# Patient Record
Sex: MALE | Race: WHITE | NOT HISPANIC OR LATINO
[De-identification: names, ages, dates, MRNs, and addresses within clinical notes are randomized per-mention and may not be internally consistent; named-entity substitution may affect disease eponyms.]

---

## 2019-11-04 PROBLEM — Z00.00 ENCOUNTER FOR PREVENTIVE HEALTH EXAMINATION: Status: ACTIVE | Noted: 2019-11-04

## 2021-06-24 ENCOUNTER — APPOINTMENT (OUTPATIENT)
Dept: CARDIOLOGY | Facility: CLINIC | Age: 56
End: 2021-06-24
Payer: MEDICAID

## 2021-06-24 VITALS
BODY MASS INDEX: 31.08 KG/M2 | SYSTOLIC BLOOD PRESSURE: 130 MMHG | RESPIRATION RATE: 16 BRPM | WEIGHT: 198 LBS | HEART RATE: 78 BPM | HEIGHT: 67 IN | OXYGEN SATURATION: 97 % | DIASTOLIC BLOOD PRESSURE: 75 MMHG

## 2021-06-24 DIAGNOSIS — Z78.9 OTHER SPECIFIED HEALTH STATUS: ICD-10-CM

## 2021-06-24 DIAGNOSIS — F17.210 NICOTINE DEPENDENCE, CIGARETTES, UNCOMPLICATED: ICD-10-CM

## 2021-06-24 DIAGNOSIS — Z82.49 FAMILY HISTORY OF ISCHEMIC HEART DISEASE AND OTHER DISEASES OF THE CIRCULATORY SYSTEM: ICD-10-CM

## 2021-06-24 DIAGNOSIS — E78.5 HYPERLIPIDEMIA, UNSPECIFIED: ICD-10-CM

## 2021-06-24 DIAGNOSIS — Z86.79 PERSONAL HISTORY OF OTHER DISEASES OF THE CIRCULATORY SYSTEM: ICD-10-CM

## 2021-06-24 DIAGNOSIS — S59.919A UNSPECIFIED INJURY OF UNSPECIFIED FOREARM, INITIAL ENCOUNTER: ICD-10-CM

## 2021-06-24 PROCEDURE — 93000 ELECTROCARDIOGRAM COMPLETE: CPT

## 2021-06-24 PROCEDURE — 99204 OFFICE O/P NEW MOD 45 MIN: CPT

## 2021-06-24 PROCEDURE — 99072 ADDL SUPL MATRL&STAF TM PHE: CPT

## 2021-06-24 NOTE — REASON FOR VISIT
[FreeTextEntry1] : This is a 56-year-old male who presents for cardiac evaluation.  Prompting this as concerns about left arm tingling tightness numbness over period of about 1 month.  Symptoms can last hours at a time are not necessarily exertional.\par \par He has no chest pain or shortness of breath.  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  Experiences no shortness of breath chest pain or exertional fatigability with any of this.  Several years back the patient did have an elevated blood pressure but when he dropped his weight from 240 to less than 200 that disappeared.

## 2021-06-24 NOTE — PHYSICAL EXAM
[de-identified] :                    Well appearing and nourished with no obvious deformities or distress.\par \par Eyes: \par No conjunctival injection and no xanthelasmas.\par HEENT: \par Normocephalic.Normal oral mucosa. No pallor or cyanosis\par Neck: \par No jugular venous distension. with normal A and V wave forms. No palpable adenopathy.\par Cardiovascular: \par Normal rate and rhythm with normal S1, S2 and a grade 1/6 systolic murmur. Distal arterial pulses are normal. No significant peripheral edema.\par Pulmonary: \par Lungs are clear to auscultation and percussion. Normal respiratory pattern without any accessory muscle use\par Abdomen: \par Soft, non-tender ; no palpable organomegaly or masses.\par Extremities:\par No digital clubbing, cyanosis or ischemic changes.\par Skin: \par No skin lesions, rashes, ulcers or xanthomas.\par Psychiatric: \par Alert and oriented to person, place and time. Appropriate mood and affect.

## 2021-06-24 NOTE — ASSESSMENT
[FreeTextEntry1] : ECG: Normal sinus rhythm at 78 bpm.  Normal axis intervals.  No significant ST-T wave changes.\par \par Laboratory data 2/27/2021:\par Cholesterol 212\par HDL 47\par \par Triglycerides 276\par \par Impression:\par 1.  The left forearm complaints are clearly not cardiac in nature.  Given his aggressive workouts in the gym and his work as a contractor, seems most likely that he suffered some musculoskeletal injury to that left forearm or it could be neuropathic.  The symptoms are decidedly not due to coronary insufficiency despite his known risk factors.\par \par 2.  We discussed the patient's various risk factors including his very strong family history in both of his parents, his history of hypertension which has resolved, his cholesterol which is elevated and his lifelong smoking.\par \par 3.  There is a lack of regular aerobic exercise though diet appears to be quite reasonable.\par \par Plan:\par 1.  Patient reassured at great length with regard to the left forearm symptoms.  Encouraged him to cut back a bit on some of his aggressive gym workouts\par \par 2.  Regarding his lipids would like to see him curtail his diet further and begin a more aggressive aerobic type exercise in addition to his resistance exercise.\par \par 3.  In 3 months a repeat lipid panel should be obtained and if still elevated strong consideration should be given to instituting statin therapy.\par \par 4.  An exercise stress test and an echocardiogram be done to restratify him.\par \par 4.  We will regroup after the noninvasive testing on the blood work.

## 2021-08-12 ENCOUNTER — APPOINTMENT (OUTPATIENT)
Dept: CARDIOLOGY | Facility: CLINIC | Age: 56
End: 2021-08-12

## 2021-08-18 ENCOUNTER — APPOINTMENT (OUTPATIENT)
Dept: CARDIOLOGY | Facility: CLINIC | Age: 56
End: 2021-08-18

## 2021-09-09 ENCOUNTER — APPOINTMENT (OUTPATIENT)
Dept: CARDIOLOGY | Facility: CLINIC | Age: 56
End: 2021-09-09

## 2021-12-27 ENCOUNTER — TRANSCRIPTION ENCOUNTER (OUTPATIENT)
Age: 56
End: 2021-12-27

## 2022-04-05 ENCOUNTER — TRANSCRIPTION ENCOUNTER (OUTPATIENT)
Age: 57
End: 2022-04-05

## 2022-06-01 ENCOUNTER — NON-APPOINTMENT (OUTPATIENT)
Age: 57
End: 2022-06-01

## 2023-09-25 ENCOUNTER — NON-APPOINTMENT (OUTPATIENT)
Age: 58
End: 2023-09-25